# Patient Record
Sex: MALE | Race: BLACK OR AFRICAN AMERICAN | URBAN - NONMETROPOLITAN AREA
[De-identification: names, ages, dates, MRNs, and addresses within clinical notes are randomized per-mention and may not be internally consistent; named-entity substitution may affect disease eponyms.]

---

## 2021-05-02 ENCOUNTER — HOSPITAL ENCOUNTER (EMERGENCY)
Age: 51
Discharge: HOME | End: 2021-05-02
Attending: FAMILY MEDICINE

## 2021-05-02 ENCOUNTER — APPOINTMENT (OUTPATIENT)
Dept: GENERAL RADIOLOGY | Age: 51
End: 2021-05-02

## 2021-05-02 VITALS
BODY MASS INDEX: 38.5 KG/M2 | RESPIRATION RATE: 24 BRPM | HEART RATE: 99 BPM | TEMPERATURE: 97.6 F | DIASTOLIC BLOOD PRESSURE: 126 MMHG | OXYGEN SATURATION: 97 % | HEIGHT: 74 IN | SYSTOLIC BLOOD PRESSURE: 183 MMHG | WEIGHT: 300 LBS

## 2021-05-02 DIAGNOSIS — S43.401A SPRAIN OF RIGHT SHOULDER, UNSPECIFIED SHOULDER SPRAIN TYPE, INITIAL ENCOUNTER: ICD-10-CM

## 2021-05-02 DIAGNOSIS — S63.501A SPRAIN OF RIGHT WRIST, INITIAL ENCOUNTER: ICD-10-CM

## 2021-05-02 DIAGNOSIS — F10.920 ACUTE ALCOHOLIC INTOXICATION WITHOUT COMPLICATION (HCC): ICD-10-CM

## 2021-05-02 DIAGNOSIS — T07.XXXA MULTIPLE ABRASIONS: Primary | ICD-10-CM

## 2021-05-02 PROCEDURE — 99284 EMERGENCY DEPT VISIT MOD MDM: CPT

## 2021-05-02 PROCEDURE — 73110 X-RAY EXAM OF WRIST: CPT

## 2021-05-02 PROCEDURE — 73030 X-RAY EXAM OF SHOULDER: CPT

## 2021-05-02 ASSESSMENT — PAIN DESCRIPTION - FREQUENCY: FREQUENCY: CONTINUOUS

## 2021-05-02 ASSESSMENT — PAIN DESCRIPTION - ORIENTATION: ORIENTATION: RIGHT

## 2021-05-02 ASSESSMENT — PAIN SCALES - GENERAL: PAINLEVEL_OUTOF10: 10

## 2021-05-02 ASSESSMENT — PAIN DESCRIPTION - DESCRIPTORS: DESCRIPTORS: SHARP

## 2021-05-02 ASSESSMENT — PAIN DESCRIPTION - LOCATION: LOCATION: SHOULDER;HEAD

## 2021-05-02 NOTE — PROGRESS NOTES
Patient is becoming agitated - states he is leaving. Patient waiting for his niece to arrive - states if she does not show up he is leaving.   Patient is intoxicated - tearful

## 2021-05-02 NOTE — PROGRESS NOTES
[place] [employer] to bedside - talked with patient - told patient his niece was enroute - patient has calmed down and has decided to stay and be treated. Enedelia Officer remains in ED.

## 2021-05-02 NOTE — ED PROVIDER NOTES
eMERGENCY dEPARTMENT eNCOUnter        [address] [facility]    Chief Complaint   Patient presents with    Laceration     patient presents to the ED via EMS - patient fell and has laceration to right forehead - abrasion to right cheek area - also c/o right arm and right elbow pain - ETOH on board       HPI    Beena Medeiros is a 54 y.o. male who presents after falling while intoxicated on alcohol tonight. Patient was brought into the ER via emergency squad. He complains of abrasions on his face and fingers. He also complains of pain in his right shoulder and right wrist.  Symptoms are described as being moderate in severity. REVIEW OF SYSTEMS    All systems reviewed and positives are in the HPI. PAST MEDICAL HISTORY    History reviewed. No pertinent past medical history. SURGICAL HISTORY    History reviewed. No pertinent surgical history. CURRENT MEDICATIONS        ALLERGIES    Allergies   Allergen Reactions    Penicillins Swelling       FAMILY HISTORY    History reviewed. No pertinent family history.     SOCIAL HISTORY    Social History     Socioeconomic History    Marital status: Unknown     Spouse name: None    Number of children: None    Years of education: None    Highest education level: None   Occupational History    None   Social Needs    Financial resource strain: None    Food insecurity     Worry: None     Inability: None    Transportation needs     Medical: None     Non-medical: None   Tobacco Use    Smoking status: Current Every Day Smoker     Packs/day: 0.50     Years: 30.00     Pack years: 15.00    Smokeless tobacco: Never Used   Substance and Sexual Activity    Alcohol use: None    Drug use: None    Sexual activity: None   Lifestyle    Physical activity     Days per week: None     Minutes per session: None    Stress: None   Relationships    Social connections     Talks on phone: None     Gets together: None     Attends [employer] service: None     Active member of club or organization: None     Attends meetings of clubs or organizations: None     Relationship status: None    Intimate partner violence     Fear of current or ex partner: None     Emotionally abused: None     Physically abused: None     Forced sexual activity: None   Other Topics Concern    None   Social History Narrative    None       PHYSICAL EXAM    VITAL SIGNS: BP (!) 183/126   Pulse 99   Temp 97.6 °F (36.4 °C) (Temporal)   Resp 24   Ht 6' 2\" (1.88 m)   Wt 300 lb (136.1 kg)   SpO2 97%   BMI 38.52 kg/m²   Constitutional:  Well developed, well nourished, moderate acute distress, non-toxic appearance. Patient is intoxicated  HENT: Abrasions present on face. , external ears normal, nose normal, oropharynx moist.  Neck- normal range of motion, no tenderness, supple   Respiratory:  No respiratory distress, normal breath sounds. Cardiovascular:  Normal rate, normal rhythm, no murmurs, no gallops, no rubs   GI:  Soft, nondistended, normal bowel sounds, nontender   Musculoskeletal: Tenderness right shoulder and right wrist.  Decreased range of motion due to pain. Abrasions present on fingers. Integument:  Well hydrated, abrasions present on face and fingers. Neurologic: Grossly intact    RADIOLOGY/PROCEDURES    X-rays were negative for acute changes    ED COURSE & MEDICAL DECISION MAKING    Pertinent Labs & Imaging studies reviewed. (See chart for details)  Patient was stable on discharge. He became more sober during his stay in the ER. He was alert and oriented on discharge. FINAL IMPRESSION    1. Multiple abrasions  2. Right shoulder   3. Sprain of right wrist  4. Alcohol intoxication without complication    Summation      Patient Course: Patient was stable on discharge. ED Medications administered this visit:  Medications - No data to display    New Prescriptions from this visit:  There are no discharge medications for this patient.       Follow-up:  Your physician    Call in 1 day

## 2022-05-10 ENCOUNTER — HOSPITAL ENCOUNTER (EMERGENCY)
Age: 52
Discharge: HOME | End: 2022-05-10
Attending: FAMILY MEDICINE

## 2022-05-10 ENCOUNTER — APPOINTMENT (OUTPATIENT)
Dept: GENERAL RADIOLOGY | Age: 52
End: 2022-05-10

## 2022-05-10 VITALS
BODY MASS INDEX: 37.59 KG/M2 | HEART RATE: 90 BPM | TEMPERATURE: 98.7 F | SYSTOLIC BLOOD PRESSURE: 162 MMHG | HEIGHT: 74 IN | WEIGHT: 292.9 LBS | RESPIRATION RATE: 29 BRPM | OXYGEN SATURATION: 97 % | DIASTOLIC BLOOD PRESSURE: 91 MMHG

## 2022-05-10 DIAGNOSIS — Z79.4 TYPE 2 DIABETES MELLITUS WITH HYPERGLYCEMIA, WITH LONG-TERM CURRENT USE OF INSULIN (HCC): ICD-10-CM

## 2022-05-10 DIAGNOSIS — R07.9 CHEST PAIN, UNSPECIFIED TYPE: Primary | ICD-10-CM

## 2022-05-10 DIAGNOSIS — E11.65 TYPE 2 DIABETES MELLITUS WITH HYPERGLYCEMIA, WITH LONG-TERM CURRENT USE OF INSULIN (HCC): ICD-10-CM

## 2022-05-10 LAB
ABSOLUTE EOS #: 0 K/UL (ref 0–0.4)
ABSOLUTE LYMPH #: 1.2 K/UL (ref 1–4.8)
ABSOLUTE MONO #: 0.3 K/UL (ref 0–1)
ANION GAP SERPL CALCULATED.3IONS-SCNC: 15 MMOL/L (ref 9–17)
BASOPHILS # BLD: 0 % (ref 0–2)
BASOPHILS ABSOLUTE: 0 K/UL (ref 0–0.2)
BETA-HYDROXYBUTYRATE: 0.19 MMOL/L (ref 0.02–0.27)
BILIRUBIN URINE: NEGATIVE
BUN BLDV-MCNC: 17 MG/DL (ref 6–20)
CALCIUM SERPL-MCNC: 9.6 MG/DL (ref 8.6–10.4)
CHLORIDE BLD-SCNC: 92 MMOL/L (ref 98–107)
CO2: 22 MMOL/L (ref 20–31)
COLOR: YELLOW
COMMENT UA: ABNORMAL
CREAT SERPL-MCNC: 1.18 MG/DL (ref 0.7–1.2)
D-DIMER QUANTITATIVE: 0.31 MG/L FEU (ref 0–0.59)
DIFFERENTIAL TYPE: YES
EOSINOPHILS RELATIVE PERCENT: 1 % (ref 0–5)
GFR AFRICAN AMERICAN: >60 ML/MIN
GFR NON-AFRICAN AMERICAN: >60 ML/MIN
GFR SERPL CREATININE-BSD FRML MDRD: ABNORMAL ML/MIN/{1.73_M2}
GLUCOSE BLD-MCNC: 830 MG/DL (ref 70–99)
GLUCOSE URINE: ABNORMAL
HCT VFR BLD CALC: 44.9 % (ref 41–53)
HEMOGLOBIN: 14.7 G/DL (ref 13.5–17.5)
KETONES, URINE: NEGATIVE
LEUKOCYTE ESTERASE, URINE: NEGATIVE
LYMPHOCYTES # BLD: 28 % (ref 13–44)
MCH RBC QN AUTO: 29.6 PG (ref 26–34)
MCHC RBC AUTO-ENTMCNC: 32.8 G/DL (ref 31–37)
MCV RBC AUTO: 90.1 FL (ref 80–100)
MONOCYTES # BLD: 8 % (ref 5–9)
NITRITE, URINE: NEGATIVE
PDW BLD-RTO: 14.4 % (ref 12.1–15.2)
PH UA: 5 (ref 5–8)
PLATELET # BLD: 248 K/UL (ref 140–450)
POTASSIUM SERPL-SCNC: 4.4 MMOL/L (ref 3.7–5.3)
PROTEIN UA: NEGATIVE
RBC # BLD: 4.98 M/UL (ref 4.5–5.9)
SEG NEUTROPHILS: 63 % (ref 39–75)
SEGMENTED NEUTROPHILS ABSOLUTE COUNT: 2.6 K/UL (ref 2.1–6.5)
SODIUM BLD-SCNC: 129 MMOL/L (ref 135–144)
SPECIFIC GRAVITY UA: 1.01 (ref 1–1.03)
TROPONIN, HIGH SENSITIVITY: 9 NG/L (ref 0–22)
TROPONIN, HIGH SENSITIVITY: 9 NG/L (ref 0–22)
TURBIDITY: CLEAR
URINE HGB: NEGATIVE
UROBILINOGEN, URINE: NORMAL
WBC # BLD: 4.1 K/UL (ref 3.5–11)

## 2022-05-10 PROCEDURE — 84484 ASSAY OF TROPONIN QUANT: CPT

## 2022-05-10 PROCEDURE — 81003 URINALYSIS AUTO W/O SCOPE: CPT

## 2022-05-10 PROCEDURE — 80048 BASIC METABOLIC PNL TOTAL CA: CPT

## 2022-05-10 PROCEDURE — 93005 ELECTROCARDIOGRAM TRACING: CPT | Performed by: FAMILY MEDICINE

## 2022-05-10 PROCEDURE — 6370000000 HC RX 637 (ALT 250 FOR IP): Performed by: FAMILY MEDICINE

## 2022-05-10 PROCEDURE — 85379 FIBRIN DEGRADATION QUANT: CPT

## 2022-05-10 PROCEDURE — 82010 KETONE BODYS QUAN: CPT

## 2022-05-10 PROCEDURE — 99285 EMERGENCY DEPT VISIT HI MDM: CPT

## 2022-05-10 PROCEDURE — 85025 COMPLETE CBC W/AUTO DIFF WBC: CPT

## 2022-05-10 PROCEDURE — 71045 X-RAY EXAM CHEST 1 VIEW: CPT

## 2022-05-10 PROCEDURE — 36415 COLL VENOUS BLD VENIPUNCTURE: CPT

## 2022-05-10 RX ORDER — ASPIRIN 81 MG/1
324 TABLET, CHEWABLE ORAL ONCE
Status: COMPLETED | OUTPATIENT
Start: 2022-05-10 | End: 2022-05-10

## 2022-05-10 RX ORDER — NITROGLYCERIN 0.4 MG/1
0.4 TABLET SUBLINGUAL EVERY 5 MIN PRN
COMMUNITY

## 2022-05-10 RX ADMIN — ASPIRIN 81 MG 324 MG: 81 TABLET ORAL at 12:24

## 2022-05-10 ASSESSMENT — PAIN SCALES - GENERAL: PAINLEVEL_OUTOF10: 3

## 2022-05-10 ASSESSMENT — PAIN DESCRIPTION - ORIENTATION: ORIENTATION: LEFT

## 2022-05-10 ASSESSMENT — PAIN DESCRIPTION - FREQUENCY: FREQUENCY: CONTINUOUS

## 2022-05-10 ASSESSMENT — PAIN - FUNCTIONAL ASSESSMENT: PAIN_FUNCTIONAL_ASSESSMENT: 0-10

## 2022-05-10 ASSESSMENT — PAIN DESCRIPTION - DESCRIPTORS: DESCRIPTORS: STABBING

## 2022-05-10 ASSESSMENT — PAIN DESCRIPTION - LOCATION: LOCATION: CHEST

## 2022-05-10 ASSESSMENT — PAIN DESCRIPTION - PAIN TYPE: TYPE: ACUTE PAIN

## 2022-05-10 NOTE — ED PROVIDER NOTES
[address] [facility]  eMERGENCY dEPARTMENT eNCOUnter          [address]       Chief Complaint   Patient presents with    Chest Pain     patient presents to the ED with c/o left sided chest pain - states it started around 1130 - also reports dizziness - denies any nasuea or diaphoresis       Nurses Notes reviewed and I agree except as noted in the HPI. HISTORY OF PRESENT ILLNESS    Mercedez Haque is a 50 y.o. male who presents to the emergency room via private vehicle, patient planing of left-sided chest pain, onset 113 0 hours, patient describing nonradiating pain \"like somebody trying to cut my heart out\", patient denies any shortness of breath denies any nausea, but does have some dizziness with it. Patient states he has had similar pains in the past that have gone away. Patient has a history of DM, HTN, patient is a smoker. Patient denies any history of heart cath or stress test.  Denies trauma or falls. Patient does not quantify the pain. REVIEW OF SYSTEMS     Review of Systems   All other systems reviewed and are negative. PAST MEDICAL HISTORY    has no past medical history on file. SURGICAL HISTORY      has no past surgical history on file. CURRENT MEDICATIONS       Discharge Medication List as of 5/10/2022  2:36 PM      CONTINUE these medications which have NOT CHANGED    Details   nitroGLYCERIN (NITROSTAT) 0.4 MG SL tablet Place 0.4 mg under the tongue every 5 minutes as needed for Chest pain up to max of 3 total doses. If no relief after 1 dose, call 911. Historical Med             ALLERGIES     is allergic to penicillins. FAMILY HISTORY     has no family status information on file. family history is not on file. SOCIAL HISTORY      reports that he has been smoking. He has a 15.00 pack-year smoking history. He has never used smokeless tobacco. He reports current drug use. Drug: Marijuana [employer]).     PHYSICAL EXAM     INITIAL VITALS:  height is 6' 2\" (1.88 m) and weight is 292 lb 14.4 oz (132.9 kg). His temperature is 98.7 °F (37.1 °C). His blood pressure is 162/91 (abnormal) and his pulse is 90. His respiration is 29 and oxygen saturation is 97%. Physical Exam   Constitutional: Patient is oriented to person, place, and time. Patient appears well-developed and well-nourished. Patient is active and cooperative. HENT:   Head: Normocephalic and atraumatic. Head is without contusion. Right Ear: Hearing and external ear normal. No drainage. Left Ear: Hearing and external ear normal. No drainage. Nose: Nose normal. No nasal deformity. No epistaxis. Mouth/Throat: Mucous membranes are not dry. Eyes: EOMI. Conjunctivae, sclera, and lids are normal. Right eye exhibits no discharge. Left eye exhibits no discharge. Neck: Full passive range of motion without pain and phonation normal. Negative JVD. Cardiovascular:   Normal rate, regular rhythm and intact distal pulses. No lower extremity edema. Negative Rosalva's sign bilaterally. Pulses: Radial pulse is 2+   Pulmonary/Chest: Effort normal.   No tachypnea and no bradypnea. No wheezes, rhonchi, rales, or stridor. Abdominal: Soft. Patient without distension or tenderness, no rigidity, rebound, or gaurding. There is no CVA tenderness. Musculoskeletal:   Negative acute trauma or deformity,  apparent full range of motion and normal strength all extremities appropriate to age. Neurological: Patient is alert and oriented to person, place, and time. patient displays no tremor. Patient displays no seizure activity. Skin: Skin is warm and dry. Patient is not diaphoretic. Psychiatric: Patient has a normal mood and affect.  Patient speech is normal and behavior is normal. Cognition and memory are normal.      DIFFERENTIAL DIAGNOSIS:   ACS, AA, PE, GERD/gastritis, PTX, pericarditis, pleurisy, myocarditis, anxiety, msk    DIAGNOSTIC RESULTS     EKG: All EKG's are interpreted by the Emergency Department Physician who either signs or Co-signs this chart in the absence of a cardiologist.  EKG    The patient had an EKG which is interpreted by me in the absence of a Cardiologist.   [x] Without comparison to previous. [] With comparison to a previous EKG Dated     EKG @ 1202 hrs -sinus rhythm rate 94 normal axis normal intervals, noted T wave abnormality precordial leads, no prior for comparison    EKG @ 1349 hrs -sinus rhythm, rate 90, normal axis, normal intervals      RADIOLOGY: non-plain film images(s) such as CT, Ultrasound and MRI are read by the radiologist.  XR CHEST PORTABLE   Final Result      No acute change. LABS:   Labs Reviewed   BASIC METABOLIC PANEL W/ REFLEX TO MG FOR LOW K - Abnormal; Notable for the following components:       Result Value    Glucose 830 (*)     Sodium 129 (*)     Chloride 92 (*)     All other components within normal limits   URINALYSIS - Abnormal; Notable for the following components:    Glucose, Ur 1000 mg/dL (*)     All other components within normal limits   CBC WITH AUTO DIFFERENTIAL   TROPONIN   D-DIMER, QUANTITATIVE   TROPONIN   BETA-HYDROXYBUTYRATE       EMERGENCY DEPARTMENT COURSE:   Vitals:    Vitals:    05/10/22 1330 05/10/22 1345 05/10/22 1400 05/10/22 1430   BP: (!) 136/94 (!) 133/96 (!) 143/92 (!) 162/91   Pulse: 86 88 83 90   Resp: 18 17 22 29   Temp:       SpO2: 96% 96% 97%    Weight:       Height:         Patient history and physical exam taken at bedside, discussed patient's symptoms and exam findings as well as initial plan of workup to include EKG, chest x-ray, blood work with saline lock insertion, and chewable aspirin. Patient placed on cardiac monitor and continuous pulse oximetry resting semi-Fowlers in bed. EKG as above. Chest xray as above.     Received critical lab result, glucose 830, I did add BHB to patient labs    Advised patient's of his elevated blood sugar, patient does acknowledge that he has not been using his Lantus 30 units at night for about 3 weeks now, has not had a prescription for a while but was able to get some medication through another individual who was also on Lantus is no longer using it, will await remainder labs. Initial lab workup reviewed, noting hs-Heide of 9, DDimer . 19    Labs reviewed, noting normal white blood cell count differential, normal H&H, SCR 1.18, K4.4,  though corrects to 141/147 (Jonas/Ambrosio methods)    HEART Score = 4 with baseline of 4    Discussed with patient his overall work-up, would like to get a second troponin and EKG to rule out cardiac cause, patient acknowledges    1 hr hsTnT 9    EKG #2 as above    BHB 0.19    Discussed with patient his overall work-up, discussed at this time feel low probability of cardiac event based on work-up, I do note patient's elevated blood sugars, although patient is not in DKA, he is in hyperglycemia, I would strongly advise patient to go back to taking his Lantus 30 units at night, to try to be careful and mindful of the foods he eats noting he works in a very tempting environment at a [employer], patient does state he does walk to work and I advised him to increase his physical activity, I Cecille refer patient to local primary care for follow-up tablet care, patient states been having problems with insurance and he will need to follow-up with local job and family services to establish to help with the cost of his medications and overall health care, patient acknowledges.   We will go ahead discharge patient at this time, outpatient follow-up, return to ER if any symptoms change worsen or other concerns, acknowledged    Outpatient treadmill stress test ordered for patient         Sodium Correction for Hyperglycemia from [email or web address]  on 5/11/2022  ** All calculations should be rechecked by clinician prior to use **    RESULT SUMMARY:  141 mEq/L  Corrected Sodium  Tala Porter)    147 mEq/L  Corrected Sodium  (Ambrosio, Ruel)      INPUTS:  Sodium --> 129 mEq/L  Glucose --> 830 mg/dL      FINAL IMPRESSION      1. Chest pain, unspecified type    2. Type 2 diabetes mellitus with hyperglycemia, with long-term current use of insulin [facility])          DISPOSITION/PLAN   Discharge    PATIENT REFERRED TO:  Isidoro Castañeda MD  [address] [facility] [address]  [phone]    Call       [facility]  [address] [facility]  [phone]          DISCHARGE MEDICATIONS:  Discharge Medication List as of 5/10/2022  2:36 PM              Summation      Patient Course: Discharge    ED Medications administered this visit:    Medications   aspirin chewable tablet 324 mg (324 mg Oral Given 5/10/22 1224)       New Prescriptions from this visit:    Discharge Medication List as of 5/10/2022  2:36 PM          Follow-up:  Isidoro Castañeda MD  [address] [facility] [address]  [phone]    Call       [facility] [address]  [address] [facility] [address] [address] [place] [address]            Final Impression:   1. Chest pain, unspecified type    2.  Type 2 diabetes mellitus with hyperglycemia, with long-term current use of insulin ([facility] [address]               (Please note that portions of this note were completed with a voice recognition program.  Efforts were made to edit the dictations but occasionally words are mis-transcribed.)    MD Sahil Waltres MD  05/11/22 2803

## 2022-05-10 NOTE — LETTER
[facility] ED  [address]  Phone: [phone]               May 10, 2022    Patient: Lulu Salazar   YOB: 1970   Date of Visit: 5/10/2022       To Whom It May Concern:    Lulu Salazar was seen and treated in our emergency department on 5/10/2022. He may return to work on 05/11/2022.       Sincerely,       Mikhail Jackson RN         Signature:__________________________________

## 2022-05-10 NOTE — ED NOTES
20 g Saline lock removed from right AC with cath intact and patient tolerated well.       Trey Chavez RN  05/10/22 0471

## 2022-05-10 NOTE — ED NOTES
Pt request to set up his own stress test d/t his work schedule. Patient given scheduling number and instructed to call them tomorrow.         Goyo Kwok RN  05/10/22 7428

## 2022-05-11 LAB
EKG ATRIAL RATE: 90 BPM
EKG ATRIAL RATE: 94 BPM
EKG P AXIS: 71 DEGREES
EKG P AXIS: 77 DEGREES
EKG P-R INTERVAL: 168 MS
EKG P-R INTERVAL: 174 MS
EKG Q-T INTERVAL: 366 MS
EKG Q-T INTERVAL: 376 MS
EKG QRS DURATION: 96 MS
EKG QRS DURATION: 96 MS
EKG QTC CALCULATION (BAZETT): 457 MS
EKG QTC CALCULATION (BAZETT): 459 MS
EKG R AXIS: 1 DEGREES
EKG R AXIS: 3 DEGREES
EKG T AXIS: 53 DEGREES
EKG T AXIS: 59 DEGREES
EKG VENTRICULAR RATE: 90 BPM
EKG VENTRICULAR RATE: 94 BPM

## 2022-05-11 PROCEDURE — 93010 ELECTROCARDIOGRAM REPORT: CPT | Performed by: INTERNAL MEDICINE

## 2022-05-11 ASSESSMENT — HEART SCORE: ECG: 1

## 2023-02-07 ENCOUNTER — HOSPITAL ENCOUNTER (EMERGENCY)
Age: 53
Discharge: HOME | End: 2023-02-07
Attending: EMERGENCY MEDICINE

## 2023-02-07 ENCOUNTER — APPOINTMENT (OUTPATIENT)
Dept: GENERAL RADIOLOGY | Age: 53
End: 2023-02-07

## 2023-02-07 VITALS
WEIGHT: 315 LBS | RESPIRATION RATE: 22 BRPM | HEIGHT: 74 IN | BODY MASS INDEX: 40.43 KG/M2 | TEMPERATURE: 97.9 F | SYSTOLIC BLOOD PRESSURE: 148 MMHG | OXYGEN SATURATION: 96 % | DIASTOLIC BLOOD PRESSURE: 98 MMHG | HEART RATE: 100 BPM

## 2023-02-07 DIAGNOSIS — R94.31 ABNORMAL EKG: ICD-10-CM

## 2023-02-07 DIAGNOSIS — R07.9 CHEST PAIN, UNSPECIFIED TYPE: Primary | ICD-10-CM

## 2023-02-07 LAB
ABSOLUTE EOS #: 0.1 K/UL (ref 0–0.4)
ABSOLUTE LYMPH #: 0.6 K/UL (ref 1–4.8)
ABSOLUTE MONO #: 0.1 K/UL (ref 0–1)
ALBUMIN SERPL-MCNC: 3.8 G/DL (ref 3.5–5.2)
ALP SERPL-CCNC: 87 U/L (ref 40–129)
ALT SERPL-CCNC: 13 U/L (ref 5–41)
ANION GAP SERPL CALCULATED.3IONS-SCNC: 13 MMOL/L (ref 9–17)
AST SERPL-CCNC: 18 U/L
BASOPHILS # BLD: 0 % (ref 0–2)
BASOPHILS ABSOLUTE: 0 K/UL (ref 0–0.2)
BILIRUB SERPL-MCNC: 0.3 MG/DL (ref 0.3–1.2)
BUN SERPL-MCNC: 11 MG/DL (ref 6–20)
BUN/CREAT BLD: 10 (ref 9–20)
CALCIUM SERPL-MCNC: 9.1 MG/DL (ref 8.6–10.4)
CHLORIDE SERPL-SCNC: 103 MMOL/L (ref 98–107)
CO2 SERPL-SCNC: 22 MMOL/L (ref 20–31)
CREAT SERPL-MCNC: 1.06 MG/DL (ref 0.7–1.2)
DIFFERENTIAL TYPE: YES
EKG ATRIAL RATE: 112 BPM
EKG P AXIS: 75 DEGREES
EKG P-R INTERVAL: 172 MS
EKG Q-T INTERVAL: 332 MS
EKG QRS DURATION: 96 MS
EKG QTC CALCULATION (BAZETT): 453 MS
EKG R AXIS: -28 DEGREES
EKG T AXIS: 100 DEGREES
EKG VENTRICULAR RATE: 112 BPM
EOSINOPHILS RELATIVE PERCENT: 2 % (ref 0–5)
GFR SERPL CREATININE-BSD FRML MDRD: >60 ML/MIN/1.73M2
GLUCOSE SERPL-MCNC: 334 MG/DL (ref 70–99)
HCT VFR BLD AUTO: 44.6 % (ref 41–53)
HGB BLD-MCNC: 14.9 G/DL (ref 13.5–17.5)
LYMPHOCYTES # BLD: 11 % (ref 13–44)
MCH RBC QN AUTO: 29.9 PG (ref 26–34)
MCHC RBC AUTO-ENTMCNC: 33.4 G/DL (ref 31–37)
MCV RBC AUTO: 89.6 FL (ref 80–100)
MONOCYTES # BLD: 2 % (ref 5–9)
PDW BLD-RTO: 15.4 % (ref 12.1–15.2)
PLATELET # BLD AUTO: 221 K/UL (ref 140–450)
POTASSIUM SERPL-SCNC: 4.3 MMOL/L (ref 3.7–5.3)
PROT SERPL-MCNC: 7.3 G/DL (ref 6.4–8.3)
RBC # BLD: 4.98 M/UL (ref 4.5–5.9)
SEG NEUTROPHILS: 85 % (ref 39–75)
SEGMENTED NEUTROPHILS ABSOLUTE COUNT: 4.6 K/UL (ref 2.1–6.5)
SODIUM SERPL-SCNC: 138 MMOL/L (ref 135–144)
TROPONIN I SERPL DL<=0.01 NG/ML-MCNC: 10 NG/L (ref 0–22)
TROPONIN I SERPL DL<=0.01 NG/ML-MCNC: 8 NG/L (ref 0–22)
WBC # BLD AUTO: 5.4 K/UL (ref 3.5–11)

## 2023-02-07 PROCEDURE — 84484 ASSAY OF TROPONIN QUANT: CPT

## 2023-02-07 PROCEDURE — 85025 COMPLETE CBC W/AUTO DIFF WBC: CPT

## 2023-02-07 PROCEDURE — 71045 X-RAY EXAM CHEST 1 VIEW: CPT | Performed by: RADIOLOGY

## 2023-02-07 PROCEDURE — 80053 COMPREHEN METABOLIC PANEL: CPT

## 2023-02-07 PROCEDURE — 71045 X-RAY EXAM CHEST 1 VIEW: CPT

## 2023-02-07 PROCEDURE — 93010 ELECTROCARDIOGRAM REPORT: CPT | Performed by: INTERNAL MEDICINE

## 2023-02-07 PROCEDURE — 6370000000 HC RX 637 (ALT 250 FOR IP): Performed by: EMERGENCY MEDICINE

## 2023-02-07 PROCEDURE — 99285 EMERGENCY DEPT VISIT HI MDM: CPT

## 2023-02-07 PROCEDURE — 93005 ELECTROCARDIOGRAM TRACING: CPT | Performed by: EMERGENCY MEDICINE

## 2023-02-07 PROCEDURE — 36415 COLL VENOUS BLD VENIPUNCTURE: CPT

## 2023-02-07 RX ORDER — NITROGLYCERIN 0.4 MG/1
0.4 TABLET SUBLINGUAL EVERY 5 MIN PRN
Status: DISCONTINUED | OUTPATIENT
Start: 2023-02-07 | End: 2023-02-07 | Stop reason: HOSPADM

## 2023-02-07 RX ORDER — ASPIRIN 325 MG
325 TABLET ORAL ONCE
Status: DISCONTINUED | OUTPATIENT
Start: 2023-02-07 | End: 2023-02-07

## 2023-02-07 RX ORDER — ASPIRIN 81 MG/1
324 TABLET, CHEWABLE ORAL ONCE
Status: COMPLETED | OUTPATIENT
Start: 2023-02-07 | End: 2023-02-07

## 2023-02-07 RX ORDER — INSULIN GLARGINE 100 [IU]/ML
30 INJECTION, SOLUTION SUBCUTANEOUS NIGHTLY
COMMUNITY

## 2023-02-07 RX ADMIN — NITROGLYCERIN 0.4 MG: 0.4 TABLET SUBLINGUAL at 05:38

## 2023-02-07 RX ADMIN — ASPIRIN 324 MG: 81 TABLET, CHEWABLE ORAL at 05:40

## 2023-02-07 ASSESSMENT — PAIN DESCRIPTION - DESCRIPTORS: DESCRIPTORS: PRESSURE

## 2023-02-07 ASSESSMENT — PAIN DESCRIPTION - FREQUENCY: FREQUENCY: CONTINUOUS

## 2023-02-07 ASSESSMENT — PAIN SCALES - GENERAL
PAINLEVEL_OUTOF10: 0
PAINLEVEL_OUTOF10: 0
PAINLEVEL_OUTOF10: 9

## 2023-02-07 ASSESSMENT — PAIN DESCRIPTION - LOCATION: LOCATION: CHEST

## 2023-02-07 ASSESSMENT — PAIN - FUNCTIONAL ASSESSMENT: PAIN_FUNCTIONAL_ASSESSMENT: 0-10

## 2023-02-07 NOTE — ED PROVIDER NOTES
EMERGENCY DEPARTMENT ENCOUNTER      CHIEF COMPLAINT    Chief Complaint   Patient presents with    Chest Pain     C/o chest pain substernal states that this pain started when he woke up at 4:15 states that this pain feels like pressure. Reports sob primarily with ambulation states that nothing makes his pain better or worse just constant. WASHINGTON Hernandez is a 50 y.o. male who presents to the emergency room for evaluation of chest pain that started 1 hour prior to arrival.  He reports associated shortness of breath and nausea. No diaphoresis. No radiation. PAST MEDICAL HISTORY    History reviewed. No pertinent past medical history. SURGICAL HISTORY    History reviewed. No pertinent surgical history. CURRENT MEDICATIONS    Current Outpatient Rx   Medication Sig Dispense Refill    insulin glargine (LANTUS) 100 UNIT/ML injection vial Inject 30 Units into the skin nightly States that he takes this every night at 9pm      nitroGLYCERIN (NITROSTAT) 0.4 MG SL tablet Place 0.4 mg under the tongue every 5 minutes as needed for Chest pain up to max of 3 total doses. If no relief after 1 dose, call 911. ALLERGIES    Allergies   Allergen Reactions    Penicillins Swelling       FAMILY HISTORY    History reviewed. No pertinent family history.     SOCIAL HISTORY    Social History     Socioeconomic History    Marital status: Unknown     Spouse name: None    Number of children: None    Years of education: None    Highest education level: None   Tobacco Use    Smoking status: Every Day     Packs/day: 0.50     Years: 30.00     Pack years: 15.00     Types: Cigarettes    Smokeless tobacco: Never   Vaping Use    Vaping Use: Never used   Substance and Sexual Activity    Drug use: Yes     Types: Marijuana (Weed)           Review of Systems:  Constitutional:  Denies fever, chills, weight loss or weakness   Eyes:  Denies photophobia or discharge   HENT:  Denies sore throat or ear pain   Respiratory:  Denies cough or shortness of breath   Cardiovascular:  Denies chest pain, palpitations or swelling   GI:  Denies abdominal pain, nausea, vomiting, or diarrhea   Musculoskeletal:  Denies back pain   Skin:  Denies rash   Neurologic:  Denies headache, focal weakness or sensory changes   Endocrine:  Denies polyuria or polydypsia   Lymphatic:  Denies swollen glands   Psychiatric:  Denies depression, suicidal ideation or homicidal ideation   All systems negative except as marked. PHYSICAL EXAM    VITAL SIGNS: BP (!) 148/88   Pulse (!) 104   Temp 97.9 °F (36.6 °C)   Resp 22   Ht 6' 2\" (1.88 m)   Wt (!) 319 lb (144.7 kg)   SpO2 100%   BMI 40.96 kg/m²    Constitutional:  Well developed, Well nourished, No acute distress, Non-toxic appearance. Eyes:  PERRL, EOMI, Conjunctiva normal, No discharge. Respiratory:  Normal breath sounds, No respiratory distress, No wheezing, No chest tenderness. Cardiovascular: Tachycardia noted, normal rhythm, No murmurs, No rubs, No gallops. GI:  Bowel sounds normal, Soft, No tenderness, No masses, No pulsatile masses. Integument:  Warm, Dry, No erythema, No rash. Neurologic:  Alert & oriented x 3, Normal motor function, Normal sensory function, No focal deficits noted. Psychiatric:  Affect normal, Judgment normal, Mood normal.     EKG     EKG Interpretation    Interpreted by me    Rhythm: Sinus tachycardia  Rate: Tachycardia 112 bpm  Axis: normal  Ectopy: none  Conduction: normal  ST Segments: no acute change  T Waves: no acute change  Q Waves: none    Clinical Impression: no acute changes and normal EKG      RADIOLOGY    XR CHEST 1 VIEW   Final Result      Mild pulmonary vascular congestion with lingular and left lower lobe airspace    disease. FOLLOW-UP: Follow-up as clinically indicated.              Labs  Labs Reviewed   CBC WITH AUTO DIFFERENTIAL - Abnormal; Notable for the following components:       Result Value    RDW 15.4 (*)     Seg Neutrophils 85 (*) Lymphocytes 11 (*)     Monocytes 2 (*)     Absolute Lymph # 0.60 (*)     All other components within normal limits   COMPREHENSIVE METABOLIC PANEL - Abnormal; Notable for the following components:    Glucose 334 (*)     All other components within normal limits   TROPONIN   TROPONIN       MDM: Patient presents to the emergency room for evaluation of chest pain that started 1 hour prior to arrival.  Physical exam normal.  Vital signs stable. Laboratory studies ordered and independently reviewed by me including 2 troponins 90 minutes apart. .  Chest x-ray performed and radiology interpretation independently reviewed by me and normal.  Patient was given 1 nitroglycerin in the emergency room along with aspirin with complete resolution of symptoms. Differential diagnosis includes PE versus ACS versus aortic dissection versus atypical chest pain. Admission was considered. No SODH. Patient will be referred to cardiology for follow-up. Summation      Patient Course:     ED Medications administered this visit:    Medications   nitroGLYCERIN (NITROSTAT) SL tablet 0.4 mg (0.4 mg SubLINGual Given 2/7/23 0538)   aspirin chewable tablet 324 mg (324 mg Oral Given 2/7/23 0540)       New Prescriptions from this visit:    Current Discharge Medication List          Follow-up:  Tavo Sparrow MD  [place] [id number] [phone]    Schedule an appointment as soon as possible for a visit         Final Impression:   1.  Chest pain, unspecified type New Problem              (Please note that portions of this note were completed with a voice recognition program.  Efforts were made to edit the dictations but occasionally words are mis-transcribed.)         Lucina Harris DO  02/07/23 8963

## 2023-02-07 NOTE — DISCHARGE INSTRUCTIONS
Follow-up with cardiology for reevaluation. Return to the emergency room immediately for worsening symptoms. Take aspirin daily.

## 2023-02-07 NOTE — ED NOTES
Pt was provided with a warm blanket, lights dimmed, and call light within reach. Denies any other needs at this time.       Odell Lanes, RN  02/07/23 5200

## 2023-02-07 NOTE — LETTER
[facility] ED  [address], [address]  Phone: [phone]               February 7, 2023    Patient: Dian Mendoza   YOB: 1970   Date of Visit: 2/7/2023       To Whom It May Concern:    Dian Mendoza was seen and treated in our emergency department on 2/7/2023. He may return to work on 02/08/2023.       Sincerely,       Radha Farrar RN         Signature:__________________________________

## 2023-02-07 NOTE — ED NOTES
Pt requested to use the bathroom this nurse communicated with the patient that a urinal will be provided d/t complaint and recent medication administration. Pt states that his chest pain is gone however, he started to get a headache.       Hernán Davis RN  02/07/23 4251

## 2023-02-16 ENCOUNTER — HOSPITAL ENCOUNTER (OUTPATIENT)
Dept: NON INVASIVE DIAGNOSTICS | Age: 53
Discharge: HOME | End: 2023-02-16

## 2023-02-16 ENCOUNTER — HOSPITAL ENCOUNTER (OUTPATIENT)
Dept: NUCLEAR MEDICINE | Age: 53
Discharge: HOME | End: 2023-02-18

## 2023-02-16 VITALS — SYSTOLIC BLOOD PRESSURE: 145 MMHG | HEART RATE: 78 BPM | DIASTOLIC BLOOD PRESSURE: 94 MMHG

## 2023-02-16 DIAGNOSIS — R94.31 ABNORMAL EKG: ICD-10-CM

## 2023-02-16 DIAGNOSIS — R07.9 CHEST PAIN, UNSPECIFIED TYPE: ICD-10-CM

## 2023-02-16 PROCEDURE — 6360000002 HC RX W HCPCS: Performed by: INTERNAL MEDICINE

## 2023-02-16 PROCEDURE — 2580000003 HC RX 258: Performed by: INTERNAL MEDICINE

## 2023-02-16 PROCEDURE — 78452 HT MUSCLE IMAGE SPECT MULT: CPT

## 2023-02-16 PROCEDURE — 93017 CV STRESS TEST TRACING ONLY: CPT

## 2023-02-16 PROCEDURE — 3430000000 HC RX DIAGNOSTIC RADIOPHARMACEUTICAL: Performed by: INTERNAL MEDICINE

## 2023-02-16 PROCEDURE — A9500 TC99M SESTAMIBI: HCPCS | Performed by: INTERNAL MEDICINE

## 2023-02-16 RX ORDER — TECHNETIUM TC-99M SESTAMIBI 1 MG/10ML
10 INJECTION INTRAVENOUS
Status: COMPLETED | OUTPATIENT
Start: 2023-02-16 | End: 2023-02-16

## 2023-02-16 RX ORDER — TECHNETIUM TC-99M SESTAMIBI 1 MG/10ML
30 INJECTION INTRAVENOUS
Status: COMPLETED | OUTPATIENT
Start: 2023-02-16 | End: 2023-02-16

## 2023-02-16 RX ORDER — SODIUM CHLORIDE 0.9 % (FLUSH) 0.9 %
10 SYRINGE (ML) INJECTION PRN
Status: ACTIVE | OUTPATIENT
Start: 2023-02-16 | End: 2023-02-16

## 2023-02-16 RX ORDER — AMINOPHYLLINE DIHYDRATE 25 MG/ML
50 INJECTION, SOLUTION INTRAVENOUS PRN
Status: DISPENSED | OUTPATIENT
Start: 2023-02-16 | End: 2023-02-16

## 2023-02-16 RX ADMIN — TETRAKIS(2-METHOXYISOBUTYLISOCYANIDE)COPPER(I) TETRAFLUOROBORATE 10 MILLICURIE: 1 INJECTION, POWDER, LYOPHILIZED, FOR SOLUTION INTRAVENOUS at 09:08

## 2023-02-16 RX ADMIN — TETRAKIS(2-METHOXYISOBUTYLISOCYANIDE)COPPER(I) TETRAFLUOROBORATE 30 MILLICURIE: 1 INJECTION, POWDER, LYOPHILIZED, FOR SOLUTION INTRAVENOUS at 09:08

## 2023-02-16 RX ADMIN — SODIUM CHLORIDE, PRESERVATIVE FREE 10 ML: 5 INJECTION INTRAVENOUS at 10:24

## 2023-02-16 RX ADMIN — REGADENOSON 0.4 MG: 0.08 INJECTION, SOLUTION INTRAVENOUS at 10:24

## 2023-02-16 NOTE — PROGRESS NOTES
Lexiscan administered. 10:26 Pt denies chest pain or shortness of breath. 10:30 Pt denies any chest pain or shortness of breath. Pt given snack and beverage.

## 2023-02-17 NOTE — PROCEDURES
[place] [address]                              CARDIAC STRESS TEST    PATIENT NAME: Holly Klein                     :        1970  MED REC NO:   [id number]                              ROOM:  ACCOUNT NO:   [de-identified]                           ADMIT DATE: 2023  PROVIDER:     Armando Wilkinson MD      DATE OF STUDY:  2023    Cardiovascular Diagnostics Department    Ordering Provider:  Marielle Malcolm MD    Interpreting Physician:   Inez Cuevas. Cici Amos MD    MYOCARDIAL PERFUSION STRESS IMAGING    The stress ECG results are reported separately. NUCLEAR IMAGING RESULTS:  The overall quality of the study is fair. Mild attenuation artifact was seen. There is no evidence of abnormal  lung uptake. Additionally, the right ventricle appears normal.  The  left ventricular cavity is noted to be enlarged in size on stress  images. There is no evidence of transient ischemic dilatation (TID) of  the left ventricle. Gated SPECT imaging demonstrates global hypokinesis. The calculated left  ventricular ejection fraction was 36%. The rest images demonstrated a moderate perfusion abnormality of mild to  moderate intensity in the inferior region which is most likely due to  artifact. On stress imaging, a moderate perfusion abnormality of mild to moderate  intensity was noted in the inferior region which is most likely due to  artifact. IMPRESSION:  1. Limited quality study. 2.  Abnormal study with dilated left ventricle, global hypokinesis and  estimated ejection fraction of 36%. Abnormal myocardial perfusion. There is a moderate perfusion defect of  mild to moderate intensity in the inferior region during stress and rest  imaging, which is most consistent with artifact, but may be due to a  small degree of coronary ischemia.     3.  Global left ventricular systolic function was abnormal with an  ejection fraction of 36%, without regional wall motion abnormalities. Overall these results are most consistent with an intermediate risk for  significant coronary artery disease. Depending on the patient's symptoms and level of clinical suspicion,  aggressive medical management vs.  additional testing by coronary  angiography may be indicated. The results of this test were discussed with Dr. Braxton Gross on 2/16/23 at  [place] [address]..         Bob Shields MD    D: 02/17/2023 7:42:15       T: 02/17/2023 7:44:24     RONALD/[email or web address]_IRVING  Job#: [id number]     Doc#: Unknown    CC:  Hema Johnson MD

## 2023-02-19 NOTE — PROCEDURES
[place] [address]                              CARDIAC STRESS TEST    PATIENT NAME: Megan Marin                     :        1970  MED REC NO:   [id number]                              ROOM:  ACCOUNT NO:   [de-identified]                           ADMIT DATE: 2023  PROVIDER:     Maria E Murillo MD    DATE OF STUDY:  2023    NAME OF TEST:  Treadmill stress test that was converted to a Myoview. We attempted to do a treadmill stress test, however, he walked two  minutes and stated \"he was too tired\" and his \"legs were too fatigued. \"   No chest pain or ST depression. We had an inadequate heart rate and  therefore we had to switch to an Fina. LEXISCAN CARDIOLITE STRESS TEST:    IMPRESSION:  1. We gave 0.4 mg of Lexiscan intravenously. 2.  This was followed in 20 seconds by Cardiolite infusion. 3.  There was no chest pain. 4.  There was no ST depression. 5.  It was an overall negative Lexiscan stress test.  6.  Cardiolite to follow.         Ekta Gamboa MD    D: 2023 7:28:38       T: 2023 10:00:51     [id number]  Job#: [id number]     Doc#: [id number]    CC:

## 2023-02-20 DIAGNOSIS — R06.02 SOB (SHORTNESS OF BREATH): Primary | ICD-10-CM

## 2023-02-20 RX ORDER — ISOSORBIDE MONONITRATE 30 MG/1
30 TABLET, EXTENDED RELEASE ORAL DAILY
Qty: 30 TABLET | Refills: 3 | Status: SHIPPED | OUTPATIENT
Start: 2023-02-20

## 2023-02-20 RX ORDER — ASPIRIN 81 MG/1
81 TABLET ORAL DAILY
Qty: 30 TABLET | Refills: 11 | Status: SHIPPED | OUTPATIENT
Start: 2023-02-20

## 2023-02-20 NOTE — TELEPHONE ENCOUNTER
Pt notified of abn stress  1) echo  2) appt soon   3) lopressor 25 mg bid  4) Imdur 30 mg qd  5)asa 81 mg qd

## 2023-02-27 ENCOUNTER — HOSPITAL ENCOUNTER (OUTPATIENT)
Dept: NON INVASIVE DIAGNOSTICS | Age: 53
Discharge: HOME | End: 2023-02-27

## 2023-02-27 ENCOUNTER — HOSPITAL ENCOUNTER (OUTPATIENT)
Age: 53
Discharge: HOME | End: 2023-02-27

## 2023-02-27 ENCOUNTER — OFFICE VISIT (OUTPATIENT)
Dept: CARDIOLOGY CLINIC | Age: 53
End: 2023-02-27

## 2023-02-27 VITALS
DIASTOLIC BLOOD PRESSURE: 70 MMHG | SYSTOLIC BLOOD PRESSURE: 130 MMHG | BODY MASS INDEX: 39.42 KG/M2 | WEIGHT: 307 LBS | OXYGEN SATURATION: 97 % | HEART RATE: 80 BPM

## 2023-02-27 DIAGNOSIS — R01.1 MURMUR: ICD-10-CM

## 2023-02-27 DIAGNOSIS — R07.9 CHEST PAIN, UNSPECIFIED TYPE: ICD-10-CM

## 2023-02-27 DIAGNOSIS — Z01.818 PRE-OP TESTING: ICD-10-CM

## 2023-02-27 DIAGNOSIS — E13.69 OTHER SPECIFIED DIABETES MELLITUS WITH OTHER SPECIFIED COMPLICATION, UNSPECIFIED WHETHER LONG TERM INSULIN USE (HCC): ICD-10-CM

## 2023-02-27 DIAGNOSIS — R06.02 SOB (SHORTNESS OF BREATH): ICD-10-CM

## 2023-02-27 DIAGNOSIS — R07.9 CHEST PAIN, UNSPECIFIED TYPE: Primary | ICD-10-CM

## 2023-02-27 LAB
ABSOLUTE EOS #: 0.1 K/UL (ref 0–0.4)
ABSOLUTE LYMPH #: 1.6 K/UL (ref 1–4.8)
ABSOLUTE MONO #: 0.4 K/UL (ref 0–1)
ANION GAP SERPL CALCULATED.3IONS-SCNC: 9 MMOL/L (ref 9–17)
BASOPHILS # BLD: 1 % (ref 0–2)
BASOPHILS ABSOLUTE: 0 K/UL (ref 0–0.2)
BUN SERPL-MCNC: 15 MG/DL (ref 6–20)
BUN/CREAT BLD: 14 (ref 9–20)
CALCIUM SERPL-MCNC: 9.3 MG/DL (ref 8.6–10.4)
CHLORIDE SERPL-SCNC: 103 MMOL/L (ref 98–107)
CO2 SERPL-SCNC: 23 MMOL/L (ref 20–31)
CREAT SERPL-MCNC: 1.04 MG/DL (ref 0.7–1.2)
DIFFERENTIAL TYPE: YES
EKG ATRIAL RATE: 76 BPM
EKG P AXIS: 71 DEGREES
EKG P-R INTERVAL: 188 MS
EKG Q-T INTERVAL: 388 MS
EKG QRS DURATION: 104 MS
EKG QTC CALCULATION (BAZETT): 436 MS
EKG R AXIS: 95 DEGREES
EKG T AXIS: 11 DEGREES
EKG VENTRICULAR RATE: 76 BPM
EOSINOPHILS RELATIVE PERCENT: 2 % (ref 0–5)
GFR SERPL CREATININE-BSD FRML MDRD: >60 ML/MIN/1.73M2
GLUCOSE SERPL-MCNC: 242 MG/DL (ref 70–99)
HCT VFR BLD AUTO: 41.2 % (ref 41–53)
HGB BLD-MCNC: 13.6 G/DL (ref 13.5–17.5)
LV EF: 55 %
LVEF MODALITY: NORMAL
LYMPHOCYTES # BLD: 30 % (ref 13–44)
MCH RBC QN AUTO: 29.4 PG (ref 26–34)
MCHC RBC AUTO-ENTMCNC: 33 G/DL (ref 31–37)
MCV RBC AUTO: 89.3 FL (ref 80–100)
MONOCYTES # BLD: 8 % (ref 5–9)
PATIENT FASTING?: NO
PDW BLD-RTO: 14.6 % (ref 12.1–15.2)
PLATELET # BLD AUTO: 277 K/UL (ref 140–450)
POTASSIUM SERPL-SCNC: 4.1 MMOL/L (ref 3.7–5.3)
RBC # BLD: 4.62 M/UL (ref 4.5–5.9)
SEG NEUTROPHILS: 59 % (ref 39–75)
SEGMENTED NEUTROPHILS ABSOLUTE COUNT: 3.2 K/UL (ref 2.1–6.5)
SODIUM SERPL-SCNC: 135 MMOL/L (ref 135–144)
TSH SERPL-ACNC: 1.22 UIU/ML (ref 0.3–5)
WBC # BLD AUTO: 5.4 K/UL (ref 3.5–11)

## 2023-02-27 PROCEDURE — 99204 OFFICE O/P NEW MOD 45 MIN: CPT | Performed by: INTERNAL MEDICINE

## 2023-02-27 PROCEDURE — 80048 BASIC METABOLIC PNL TOTAL CA: CPT

## 2023-02-27 PROCEDURE — 36415 COLL VENOUS BLD VENIPUNCTURE: CPT

## 2023-02-27 PROCEDURE — 80061 LIPID PANEL: CPT

## 2023-02-27 PROCEDURE — 93005 ELECTROCARDIOGRAM TRACING: CPT

## 2023-02-27 PROCEDURE — 84443 ASSAY THYROID STIM HORMONE: CPT

## 2023-02-27 PROCEDURE — 85025 COMPLETE CBC W/AUTO DIFF WBC: CPT

## 2023-02-27 PROCEDURE — 83036 HEMOGLOBIN GLYCOSYLATED A1C: CPT

## 2023-02-27 PROCEDURE — 93306 TTE W/DOPPLER COMPLETE: CPT

## 2023-02-27 PROCEDURE — 93010 ELECTROCARDIOGRAM REPORT: CPT | Performed by: INTERNAL MEDICINE

## 2023-02-27 NOTE — LETTER
[address] CARDIOLOGY SPECIALIST  [address], [address]  Dept: [phone]  Dept Fax: [phone]          2/27/23      To Whom it May Concern: In my opinion, from a cardiology standpoint, Bandar Baumann will be having a   Heart cath on March    If you have any questions, please feel free to call me at [phone].     Sincerely,          Tavo Sparrow MD      ***

## 2023-02-27 NOTE — PROGRESS NOTES
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 Was in the ED on 2/7 for chest pain  Sharp pains feels like stabbing   No sob  Seen cardiologist as a child for   Heart murmur stopped seeing him  When he was 16. Did have chest pain last night. Took ntg. Originally Got insulin from [place]  Years ago now gets insulin from  Girlfriends ex boyfriend left over. Unknown family history  3 brothers and 3 sisters. Pt lives with a friend. Started having cp for years . Same pain   Energy level low normal for pt. Works at [employer] in [place] for 4 yrs.        Will set up heart cath on March 24 at [address] do echo , labs and ekg today

## 2023-02-28 LAB
CHOLEST SERPL-MCNC: 130 MG/DL
CHOLESTEROL/HDL RATIO: 3.4
EST. AVERAGE GLUCOSE BLD GHB EST-MCNC: 229 MG/DL
HBA1C MFR BLD: 9.6 % (ref 4–6)
HDLC SERPL-MCNC: 38 MG/DL
LDLC SERPL CALC-MCNC: 70 MG/DL (ref 0–130)
TRIGL SERPL-MCNC: 111 MG/DL

## 2023-02-28 NOTE — PROGRESS NOTES
Jayne Smalls M.D. [address] [place], [place] [address]Emily   [phone]          2023          No Family Physician      RE:   Isauro Porras  :  1970      CHIEF COMPLAINT:  1. Chest pain. 2.  Abnormal stress test with an intermediate risk of coronary artery disease. HISTORY OF PRESENT ILLNESS:  Mr. Isauro Porras is a pleasant 68-year-old  gentleman who goes by the name \"Preacher. \"    He has been having chest pain for many years; however, he has been having worsening chest pain over the last several months. He has two types. One is a sharp stabbing discomfort. The other is more of a heaviness or tightness. He did see a cardiologist as a child for a heart murmur and stopped seeing him when he was 17years old. He had no cardiac testing, except for a stress test that was done on 2023. He went to the emergency room on 2023, for chest discomfort. Myocardial infarction was ruled out. A stress test was ordered. I placed him on the treadmill, but he became very short of breath within 2 minutes on the treadmill. Therefore, we switched to a Lexiscan stress test.    His Lexiscan was abnormal with a moderate perfusion abnormality in the inferior segment and with an anterior area of hypoperfusion with almost complete normalization of redistribution images. He was overall intermediate risk of coronary artery disease. Of note also, he had global hypokinesis with an ejection fraction of 36%. An echocardiogram is pending today. Because of his abnormal stress test, I brought him in today to go over a stress to establish. He works at [employer] daily. He is a . He has been having chest pain at work. He uses nitroglycerin sublingually which helps his discomfort. He was given Imdur 30 mg daily along with Lopressor 25 mg b.i.d. and aspirin 81 mg daily in the emergency room.   His pain is improved, but is still present. He was hospitalized in [place]. He was found to be a diabetic and given insulin. He has not seen a physician now for several years. He gets his insulin from his girlfriend's ex-boyfriend who switched to a different type of insulin (???). He takes 30 units nightly at 9 o'clock. He denies any syncope or near syncope. Energy level is poor. He smokes a pack of cigarettes a day. He was drinking alcohol, but has not drank any alcohol since his stress test on 02/07. CARDIAC RISK FACTORS:  Other Family Members:  Positive. Hypertension:  Negative. Hyperlipidemia:  Unknown with labs pending. Diabetes:  Positive. Smoking:  Positive. Peripheral Vascular Disease:  Negative. MEDICATIONS:  At this time, he states that he is taking aspirin 81 mg daily, glargine 30 units nightly, Imdur 30 mg daily, Lopressor 25 mg b.i.d. and nitroglycerin sublingually. PAST MEDICAL AND SURGICAL HISTORY:  He denies any surgeries. He does have diabetes. He did see a cardiologist as a child for a heart murmur. FAMILY HISTORY:  Father had heart disease. SOCIAL HISTORY:  He is 48years old. He lives with a friend. Works at [employer] in [employer] [facility] [employer] [facility]. Been in [facility] for 4 years. He smokes a pack of cigarettes a day. He has a history of drinking alcohol, although he states he has not drank any alcohol since 02/07. REVIEW OF SYSTEMS:  Cardiac as above. Other systems reviewed including constitutional, eyes, ears, nose and throat, cardiovascular, respiratory, GI, , musculoskeletal, integumentary, neurologic, psychiatric, endocrine, hematologic and allergic/immunologic are negative except for what is described above. No weight loss or weight gain. No change in bowel habits. No blood in stools. No fevers, sweats or chills. PHYSICAL EXAMINATION:  VITAL SIGNS:  His blood pressure was 130/70 with a heart rate of 80 and regular. Respirations were 18.   O2 saturation 97%.  Weight 207 pounds. GENERAL:  He is a pleasant 61-year-old  gentleman who was oriented to person, place, and time. Answered questions appropriately. SKIN:  No unusual skin changes. HEENT:  The pupils are equally round and intact. Mucous membranes were dry. NECK:  No JVD. Good carotid pulses. No carotid bruits. No lymphadenopathy or thyromegaly. CARDIOVASCULAR EXAM:  S1 and S2 were normal.  No S3 or S4. Soft systolic blowing type murmur. No diastolic murmur. PMI was normal.  No lift, thrust, or pericardial friction rub. LUNGS:  Clear to auscultation and percussion. ABDOMEN:  Soft and nontender. Good bowel sounds. EXTREMITIES:  Good femoral pulses. Good pedal pulses. No pedal edema. Skin was warm and dry. No calf tenderness. Nail beds pink. Good cap refill. PULSES:  Bilateral symmetrical radial, brachial and carotid pulses. No carotid bruits. Good femoral and pedal pulses. NEUROLOGIC EXAM:  Within normal limits. PSYCHIATRIC EXAM:  Within normal limits. LABORATORY DATA:  His Lexiscan cardiac stress test showed a defect inferolaterally as well as anteriorly with an overall intermediate risk of coronary artery disease and EF was 36%. Echocardiogram is pending today. IMPRESSION:  1. Chest pain with some of his chest pain consistent with angina, but his other sharp discomfort most likely noncardiac. 2.  Abnormal Lexiscan Cardiolite stress test at intermediate risk of coronary artery disease with both anterior and inferolateral wall ischemia with an EF of 36% on his Lexiscan stress test.  3.  Insulin-dependent diabetes, with him taking insulin from unknown from his girlfriend's ex-boyfriend and he states he is taking 30 units nightly. 4.  Continued smoking abuse. PLAN:  1. We will get labs today including hemoglobin A1c as well as rechecking basic metabolic profile and lipids and CBC as well as TSH. 2.  Echocardiogram today to look at his LV function.   3.  We will proceed with cardiac catheterization to define his anatomy. DISCUSSION:  Mr. Fu Retort stress test was fairly abnormal.  It had both anterior and inferolateral wall ischemia with almost complete normalization of redistribution images. In addition, his EF was 36% on the Jacklynn Mendoza. We will do an echocardiogram today to look at his LV function and also his valve status. I am still not sure of how he is getting insulin or how much he is taking. We will draw hemoglobin A1c on today's blood work. Also, I do not have any lipids and we will draw lipids today. I am concerned about his cardiac status. We will proceed directly with a cardiac catheterization to define his anatomy. I did encourage him to get a primary care physician and we will have him see with physicians here in hospital clinic. Thank you very much.         William Strauss MD    D: 02/27/2023 12:14:49     T: 02/27/2023 12:20:27     [id number]  Job#: [id number]   Doc#: [id number]

## 2023-11-15 ENCOUNTER — HOSPITAL ENCOUNTER (EMERGENCY)
Age: 53
Discharge: HOME | End: 2023-11-15
Attending: EMERGENCY MEDICINE
Payer: MEDICAID

## 2023-11-15 ENCOUNTER — APPOINTMENT (OUTPATIENT)
Dept: GENERAL RADIOLOGY | Age: 53
End: 2023-11-15
Payer: MEDICAID

## 2023-11-15 VITALS
WEIGHT: 300 LBS | HEART RATE: 77 BPM | RESPIRATION RATE: 19 BRPM | SYSTOLIC BLOOD PRESSURE: 138 MMHG | DIASTOLIC BLOOD PRESSURE: 87 MMHG | HEIGHT: 74 IN | BODY MASS INDEX: 38.5 KG/M2 | TEMPERATURE: 98.2 F | OXYGEN SATURATION: 97 %

## 2023-11-15 DIAGNOSIS — R07.89 OTHER CHEST PAIN: Primary | ICD-10-CM

## 2023-11-15 LAB
ANION GAP SERPL CALCULATED.3IONS-SCNC: 14 MMOL/L (ref 9–17)
BASOPHILS # BLD: 0.04 K/UL (ref 0–0.2)
BASOPHILS NFR BLD: 1 % (ref 0–2)
BUN SERPL-MCNC: 12 MG/DL (ref 6–20)
BUN/CREAT SERPL: 9 (ref 9–20)
CALCIUM SERPL-MCNC: 9.9 MG/DL (ref 8.6–10.4)
CHLORIDE SERPL-SCNC: 103 MMOL/L (ref 98–107)
CO2 SERPL-SCNC: 20 MMOL/L (ref 20–31)
CREAT SERPL-MCNC: 1.3 MG/DL (ref 0.7–1.2)
D DIMER PPP FEU-MCNC: 0.46 UG/ML FEU (ref 0–0.59)
EOSINOPHIL # BLD: 0.08 K/UL (ref 0–0.4)
EOSINOPHILS RELATIVE PERCENT: 1 % (ref 0–5)
ERYTHROCYTE [DISTWIDTH] IN BLOOD BY AUTOMATED COUNT: 13.9 % (ref 12.1–15.2)
GFR SERPL CREATININE-BSD FRML MDRD: >60 ML/MIN/1.73M2
GLUCOSE BLD-MCNC: 176 MG/DL (ref 65–99)
GLUCOSE SERPL-MCNC: 266 MG/DL (ref 70–99)
HCT VFR BLD AUTO: 45.2 % (ref 41–53)
HGB BLD-MCNC: 15.2 G/DL (ref 13.5–17.5)
IMM GRANULOCYTES # BLD AUTO: 0 K/UL (ref 0–0.3)
IMM GRANULOCYTES NFR BLD: 0 % (ref 0–5)
LYMPHOCYTES NFR BLD: 2.22 K/UL (ref 1–4.8)
LYMPHOCYTES RELATIVE PERCENT: 37 % (ref 13–44)
MCH RBC QN AUTO: 29.3 PG (ref 26–34)
MCHC RBC AUTO-ENTMCNC: 33.6 G/DL (ref 31–37)
MCV RBC AUTO: 87.3 FL (ref 80–100)
MONOCYTES NFR BLD: 0.54 K/UL (ref 0–1)
MONOCYTES NFR BLD: 9 % (ref 5–9)
NEUTROPHILS NFR BLD: 52 % (ref 39–75)
NEUTS SEG NFR BLD: 3.16 K/UL (ref 2.1–6.5)
PLATELET # BLD AUTO: 296 K/UL (ref 140–450)
PMV BLD AUTO: 9.7 FL (ref 6–12)
POTASSIUM SERPL-SCNC: 3.9 MMOL/L (ref 3.7–5.3)
RBC # BLD AUTO: 5.18 M/UL (ref 4.5–5.9)
SODIUM SERPL-SCNC: 137 MMOL/L (ref 135–144)
TROPONIN I SERPL HS-MCNC: 10 NG/L (ref 0–22)
TROPONIN I SERPL HS-MCNC: 9 NG/L (ref 0–22)
WBC OTHER # BLD: 6 K/UL (ref 3.5–11)

## 2023-11-15 PROCEDURE — 6370000000 HC RX 637 (ALT 250 FOR IP): Performed by: EMERGENCY MEDICINE

## 2023-11-15 PROCEDURE — 96372 THER/PROPH/DIAG INJ SC/IM: CPT

## 2023-11-15 PROCEDURE — 99285 EMERGENCY DEPT VISIT HI MDM: CPT

## 2023-11-15 PROCEDURE — 85025 COMPLETE CBC W/AUTO DIFF WBC: CPT

## 2023-11-15 PROCEDURE — 82947 ASSAY GLUCOSE BLOOD QUANT: CPT

## 2023-11-15 PROCEDURE — 85379 FIBRIN DEGRADATION QUANT: CPT

## 2023-11-15 PROCEDURE — 36415 COLL VENOUS BLD VENIPUNCTURE: CPT

## 2023-11-15 PROCEDURE — 71045 X-RAY EXAM CHEST 1 VIEW: CPT

## 2023-11-15 PROCEDURE — 93005 ELECTROCARDIOGRAM TRACING: CPT | Performed by: EMERGENCY MEDICINE

## 2023-11-15 PROCEDURE — 80048 BASIC METABOLIC PNL TOTAL CA: CPT

## 2023-11-15 PROCEDURE — 84484 ASSAY OF TROPONIN QUANT: CPT

## 2023-11-15 RX ADMIN — INSULIN HUMAN 15 UNITS: 100 INJECTION, SOLUTION PARENTERAL at 17:22

## 2023-11-15 ASSESSMENT — LIFESTYLE VARIABLES
HOW MANY STANDARD DRINKS CONTAINING ALCOHOL DO YOU HAVE ON A TYPICAL DAY: PATIENT DOES NOT DRINK
HOW OFTEN DO YOU HAVE A DRINK CONTAINING ALCOHOL: NEVER

## 2023-11-15 ASSESSMENT — PAIN SCALES - GENERAL: PAINLEVEL_OUTOF10: 9

## 2023-11-15 ASSESSMENT — PAIN DESCRIPTION - ORIENTATION: ORIENTATION: LEFT

## 2023-11-15 ASSESSMENT — PAIN DESCRIPTION - DESCRIPTORS: DESCRIPTORS: SHARP

## 2023-11-15 ASSESSMENT — PAIN DESCRIPTION - LOCATION: LOCATION: CHEST

## 2023-11-15 ASSESSMENT — HEART SCORE: ECG: 1

## 2023-11-15 ASSESSMENT — PAIN DESCRIPTION - FREQUENCY: FREQUENCY: INTERMITTENT

## 2023-11-15 ASSESSMENT — PAIN - FUNCTIONAL ASSESSMENT: PAIN_FUNCTIONAL_ASSESSMENT: 0-10

## 2023-11-15 NOTE — ED PROVIDER NOTES
[address] [facility] COMPLAINT    Chief Complaint   Patient presents with    Chest Pain     Patient arrived via [facility] at 6518 with complaints of chest pain that started this morning at 9:00. During triage patient experienced an episode of sharp chest pain. He states the pain will randomly come and go. WASHINGTON Kang is a 48 y.o. male who presentsto ED via EMS. Patient had chest pain at rest.  The pain started this morning at 9 AM.  Patient states that he has been having intermittent episodes of chest pain. Patient states that he has been having intermittent episodes of chest pain. Patient had a cardiac cath on March 24, 2023  Cardiac cath was negative. Patient is a smoker. At the time of exam patient is pain-free. Patient was given nitroglycerin and aspirin by EMS. Patient states that he had similar chest pain \"since he was 17\". Patient has precordial chest pain. Describes the pain as stabbing pain no radiation of the pain. Denies nausea vomiting denies diaphoresis.   PAST MEDICAL HISTORY    Past Medical History:   Diagnosis Date    Diabetes [facility])        SURGICAL HISTORY    Past Surgical History:   Procedure Laterality Date    CARDIAC CATHETERIZATION Left 03/24/2023    Dr. Noah Cadena @ [facility]--Continue medical therapy -evaluate for non cardiac etiiologies       CURRENT MEDICATIONS    Current Outpatient Rx   Medication Sig Dispense Refill    metoprolol tartrate (LOPRESSOR) 25 MG tablet Take 1 tablet by mouth 2 times daily 60 tablet 11    isosorbide mononitrate (IMDUR) 30 MG extended release tablet Take 1 tablet by mouth daily 30 tablet 3    aspirin EC 81 MG EC tablet Take 1 tablet by mouth daily 30 tablet 11    insulin glargine (LANTUS) 100 UNIT/ML injection vial Inject 30 Units into the skin nightly States that he takes this every night at 9pm      nitroGLYCERIN (NITROSTAT) 0.4 MG SL tablet Place 1 tablet under the tongue every 5 minutes as needed for Chest pain up to max

## 2023-11-16 LAB
EKG ATRIAL RATE: 79 BPM
EKG ATRIAL RATE: 98 BPM
EKG P AXIS: 70 DEGREES
EKG P AXIS: 74 DEGREES
EKG P-R INTERVAL: 170 MS
EKG P-R INTERVAL: 182 MS
EKG Q-T INTERVAL: 366 MS
EKG Q-T INTERVAL: 390 MS
EKG QRS DURATION: 100 MS
EKG QRS DURATION: 94 MS
EKG QTC CALCULATION (BAZETT): 447 MS
EKG QTC CALCULATION (BAZETT): 467 MS
EKG R AXIS: 24 DEGREES
EKG R AXIS: 32 DEGREES
EKG T AXIS: 14 DEGREES
EKG T AXIS: 66 DEGREES
EKG VENTRICULAR RATE: 79 BPM
EKG VENTRICULAR RATE: 98 BPM

## 2023-11-16 PROCEDURE — 93010 ELECTROCARDIOGRAM REPORT: CPT | Performed by: INTERNAL MEDICINE
